# Patient Record
Sex: FEMALE | Race: BLACK OR AFRICAN AMERICAN | NOT HISPANIC OR LATINO | Employment: STUDENT | URBAN - METROPOLITAN AREA
[De-identification: names, ages, dates, MRNs, and addresses within clinical notes are randomized per-mention and may not be internally consistent; named-entity substitution may affect disease eponyms.]

---
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
## 2025-07-28 NOTE — ED NOTES
Chart prep complete   Up to date or scheduled   [facility] follow up Leg cramps  #2 Pneumococcal  PHQ9  Tdap     Patient transported to [address] [place] [address] [address], RN  01/12/20 4938

## (undated) DEVICE — BLADE MYRINGOTOMY 377121

## (undated) DEVICE — GLOVE SRG BIOGEL 7.5

## (undated) DEVICE — 2000CC GUARDIAN II: Brand: GUARDIAN

## (undated) DEVICE — TUBING SUCTION 5MM X 12 FT

## (undated) DEVICE — GAUZE SPONGES,USP TYPE VII GAUZE, 12 PLY: Brand: CURITY

## (undated) DEVICE — COTTON BALLS: Brand: DEROYAL

## (undated) DEVICE — SYRINGE 10ML LL

## (undated) DEVICE — MAYO STAND COVER: Brand: CONVERTORS